# Patient Record
Sex: FEMALE | Race: WHITE | NOT HISPANIC OR LATINO | Employment: PART TIME | URBAN - METROPOLITAN AREA
[De-identification: names, ages, dates, MRNs, and addresses within clinical notes are randomized per-mention and may not be internally consistent; named-entity substitution may affect disease eponyms.]

---

## 2022-04-10 ENCOUNTER — HISTORICAL (OUTPATIENT)
Dept: ADMINISTRATIVE | Facility: HOSPITAL | Age: 22
End: 2022-04-10

## 2022-04-11 ENCOUNTER — HISTORICAL (OUTPATIENT)
Dept: ADMINISTRATIVE | Facility: HOSPITAL | Age: 22
End: 2022-04-11

## 2022-04-28 VITALS
OXYGEN SATURATION: 100 % | SYSTOLIC BLOOD PRESSURE: 121 MMHG | HEIGHT: 60 IN | DIASTOLIC BLOOD PRESSURE: 84 MMHG | WEIGHT: 116.88 LBS | BODY MASS INDEX: 22.95 KG/M2

## 2022-04-28 VITALS
DIASTOLIC BLOOD PRESSURE: 84 MMHG | BODY MASS INDEX: 22.95 KG/M2 | SYSTOLIC BLOOD PRESSURE: 121 MMHG | WEIGHT: 116.88 LBS | OXYGEN SATURATION: 100 % | HEIGHT: 60 IN

## 2022-12-28 ENCOUNTER — TELEPHONE (OUTPATIENT)
Dept: ADMINISTRATIVE | Facility: HOSPITAL | Age: 22
End: 2022-12-28
Payer: COMMERCIAL

## 2022-12-28 NOTE — TELEPHONE ENCOUNTER
----- Message from Cassidy Vivar MA sent at 12/22/2022 12:53 PM CST -----  Regarding: Dr ROCHELLE MARES Thursday 1-5-23       1. Are there any outstanding Labs, imaging or referrals in the patient's chart?        New Patient     No labs required    Please bring a list of Medications (mg and directions), Medical History, Surgical History, Family History, Allergy list, Immunizations record, Names of all past doctors we may need to request records from.           2. . Has the patient been seen in an ER, urgent care clinic, or any other health care    provider since their last visit? If yes when and where?

## 2023-01-05 ENCOUNTER — OFFICE VISIT (OUTPATIENT)
Dept: INTERNAL MEDICINE | Facility: CLINIC | Age: 23
End: 2023-01-05
Payer: COMMERCIAL

## 2023-01-05 VITALS
DIASTOLIC BLOOD PRESSURE: 74 MMHG | BODY MASS INDEX: 28.02 KG/M2 | WEIGHT: 139 LBS | SYSTOLIC BLOOD PRESSURE: 118 MMHG | HEART RATE: 96 BPM | OXYGEN SATURATION: 99 % | TEMPERATURE: 98 F | HEIGHT: 59 IN

## 2023-01-05 DIAGNOSIS — Z76.89 ESTABLISHING CARE WITH NEW DOCTOR, ENCOUNTER FOR: ICD-10-CM

## 2023-01-05 DIAGNOSIS — Z00.00 WELLNESS EXAMINATION: Primary | ICD-10-CM

## 2023-01-05 DIAGNOSIS — Z23 NEED FOR VACCINATION: Primary | ICD-10-CM

## 2023-01-05 DIAGNOSIS — R00.2 PALPITATIONS: ICD-10-CM

## 2023-01-05 PROCEDURE — 90686 IIV4 VACC NO PRSV 0.5 ML IM: CPT | Mod: ,,, | Performed by: INTERNAL MEDICINE

## 2023-01-05 PROCEDURE — 99213 PR OFFICE/OUTPT VISIT, EST, LEVL III, 20-29 MIN: ICD-10-PCS | Mod: 25,,, | Performed by: INTERNAL MEDICINE

## 2023-01-05 PROCEDURE — 90471 FLU VACCINE (QUAD) GREATER THAN OR EQUAL TO 3YO PRESERVATIVE FREE IM: ICD-10-PCS | Mod: ,,, | Performed by: INTERNAL MEDICINE

## 2023-01-05 PROCEDURE — 99385 PR PREVENTIVE VISIT,NEW,18-39: ICD-10-PCS | Mod: 1E,GY,, | Performed by: INTERNAL MEDICINE

## 2023-01-05 PROCEDURE — 99213 OFFICE O/P EST LOW 20 MIN: CPT | Mod: 25,,, | Performed by: INTERNAL MEDICINE

## 2023-01-05 PROCEDURE — 99385 PREV VISIT NEW AGE 18-39: CPT | Mod: 1E,GY,, | Performed by: INTERNAL MEDICINE

## 2023-01-05 PROCEDURE — 90686 FLU VACCINE (QUAD) GREATER THAN OR EQUAL TO 3YO PRESERVATIVE FREE IM: ICD-10-PCS | Mod: ,,, | Performed by: INTERNAL MEDICINE

## 2023-01-05 PROCEDURE — 90471 IMMUNIZATION ADMIN: CPT | Mod: ,,, | Performed by: INTERNAL MEDICINE

## 2023-01-05 NOTE — ASSESSMENT & PLAN NOTE
-symptoms started after a recent strep infection   -patient has been very athletic all her life and recently has unable to be very active because of shortness of breath and palpitations   - discussed about possibility of postviral cardiac involvement and I will go ahead and get an EKG as well as an echocardiogram to rule out any possibility of pericarditis/effusion

## 2023-01-05 NOTE — PROGRESS NOTES
Subjective:      Patient ID: Suraj Haro is a 22 y.o. female.    Chief Complaint: Establish Care    Suraj is a 22-year-old female who is here today to establish care.  Overall healthy. We will go ahead and get a wellness exam achieved.       A separate E/ M visit was performed for palpitations and shortness of breath   Patient originally is very athletic in nature however recently had a strep throat infection last month and since then has been having significant shortness of breath and deconditioning as well as palpitations.    We discussed getting an echocardiogram and EKG for completion sake.    She does not smoke or drink excessively           The patient's Health Maintenance was reviewed and the following appears to be due at this time:   Health Maintenance Due   Topic Date Due    Lipid Panel  Never done        Past Medical History:  Past Medical History:   Diagnosis Date    Anxiety     Depression     Laryngopharyngeal reflux (LPR)      History reviewed. No pertinent surgical history.  Review of patient's allergies indicates:  No Known Allergies  Social History     Socioeconomic History    Marital status: Single   Tobacco Use    Smoking status: Never    Smokeless tobacco: Never   Substance and Sexual Activity    Alcohol use: Not Currently     Comment: 3 glasses of wine a month, normally in the same day    Drug use: Never    Sexual activity: Never     Family History   Problem Relation Age of Onset    Depression Mother     Hypertension Mother     Mental illness Mother         Anxiety    Alcohol abuse Father     Diabetes Father     Hyperlipidemia Father     Alcohol abuse Maternal Grandfather             Early death Maternal Grandfather     Arthritis Maternal Grandmother     Depression Maternal Grandmother     Mental illness Maternal Grandmother         Anxiety    Early death Paternal Grandfather     Learning disabilities Brother         ADHD and ADD       Review of Systems    A comprehensive review of  "systems was performed and is negative except for that stated above  Objective:   /74 (BP Location: Left arm, Patient Position: Sitting, BP Method: Small (Manual))   Pulse 96   Temp 98.4 °F (36.9 °C) (Temporal)   Ht 4' 11" (1.499 m)   Wt 63 kg (139 lb)   LMP 12/25/2022 (Exact Date)   SpO2 99%   BMI 28.07 kg/m²     Physical Exam  Constitutional:       Appearance: Normal appearance.   HENT:      Head: Normocephalic and atraumatic.      Nose: Nose normal.      Mouth/Throat:      Mouth: Mucous membranes are moist.      Pharynx: Oropharynx is clear.   Eyes:      Extraocular Movements: Extraocular movements intact.      Pupils: Pupils are equal, round, and reactive to light.   Cardiovascular:      Rate and Rhythm: Normal rate and regular rhythm.      Pulses: Normal pulses.   Pulmonary:      Effort: Pulmonary effort is normal.      Breath sounds: Normal breath sounds.   Abdominal:      General: Bowel sounds are normal.      Palpations: Abdomen is soft.   Musculoskeletal:         General: Normal range of motion.      Cervical back: Normal range of motion and neck supple.   Skin:     General: Skin is warm.   Neurological:      General: No focal deficit present.      Mental Status: She is alert and oriented to person, place, and time. Mental status is at baseline.   Psychiatric:         Mood and Affect: Mood normal.     Assessment/ Plan:   1. Need for vaccination  -     Influenza - Quadrivalent (PF)    2. Palpitations  Assessment & Plan:   -symptoms started after a recent strep infection   -patient has been very athletic all her life and recently has unable to be very active because of shortness of breath and palpitations   - discussed about possibility of postviral cardiac involvement and I will go ahead and get an EKG as well as an echocardiogram to rule out any possibility of pericarditis/effusion      3. Establishing care with new doctor, encounter for  Assessment & Plan:  -labs are  Ordered and will follow-up " on results  -patient is advised on importance of watching her carbohydrate intake and saturated fat intake, making the right nutritional choices and exercising on a regular basis  -up-to-date with the screening

## 2023-01-05 NOTE — ASSESSMENT & PLAN NOTE
-labs are  Ordered and will follow-up on results  -patient is advised on importance of watching her carbohydrate intake and saturated fat intake, making the right nutritional choices and exercising on a regular basis  -up-to-date with the screening

## 2023-01-06 ENCOUNTER — LAB VISIT (OUTPATIENT)
Dept: LAB | Facility: HOSPITAL | Age: 23
End: 2023-01-06
Attending: INTERNAL MEDICINE
Payer: COMMERCIAL

## 2023-01-06 DIAGNOSIS — Z00.00 WELLNESS EXAMINATION: ICD-10-CM

## 2023-01-06 DIAGNOSIS — R00.2 PALPITATIONS: ICD-10-CM

## 2023-01-06 LAB
ALBUMIN SERPL-MCNC: 3.9 G/DL (ref 3.5–5)
ALBUMIN/GLOB SERPL: 1.3 RATIO (ref 1.1–2)
ALP SERPL-CCNC: 82 UNIT/L (ref 40–150)
ALT SERPL-CCNC: 11 UNIT/L (ref 0–55)
AST SERPL-CCNC: 15 UNIT/L (ref 5–34)
BASOPHILS # BLD AUTO: 0.04 X10(3)/MCL (ref 0–0.2)
BASOPHILS NFR BLD AUTO: 0.5 %
BILIRUBIN DIRECT+TOT PNL SERPL-MCNC: 0.8 MG/DL
BUN SERPL-MCNC: 9.5 MG/DL (ref 7–18.7)
CALCIUM SERPL-MCNC: 9.6 MG/DL (ref 8.4–10.2)
CHLORIDE SERPL-SCNC: 107 MMOL/L (ref 98–107)
CHOLEST SERPL-MCNC: 167 MG/DL
CHOLEST/HDLC SERPL: 4 {RATIO} (ref 0–5)
CO2 SERPL-SCNC: 26 MMOL/L (ref 22–29)
CREAT SERPL-MCNC: 0.78 MG/DL (ref 0.55–1.02)
EOSINOPHIL # BLD AUTO: 0.17 X10(3)/MCL (ref 0–0.9)
EOSINOPHIL NFR BLD AUTO: 2 %
ERYTHROCYTE [DISTWIDTH] IN BLOOD BY AUTOMATED COUNT: 12.9 % (ref 11–14.5)
EST. AVERAGE GLUCOSE BLD GHB EST-MCNC: 96.8 MG/DL
GFR SERPLBLD CREATININE-BSD FMLA CKD-EPI: >60 MLS/MIN/1.73/M2
GLOBULIN SER-MCNC: 2.9 GM/DL (ref 2.4–3.5)
GLUCOSE SERPL-MCNC: 95 MG/DL (ref 74–100)
HBA1C MFR BLD: 5 %
HCT VFR BLD AUTO: 42.2 % (ref 37–47)
HDLC SERPL-MCNC: 46 MG/DL (ref 35–60)
HGB BLD-MCNC: 13.3 GM/DL (ref 12–16)
IMM GRANULOCYTES # BLD AUTO: 0.03 X10(3)/MCL (ref 0–0.04)
IMM GRANULOCYTES NFR BLD AUTO: 0.4 %
LDLC SERPL CALC-MCNC: 109 MG/DL (ref 50–140)
LYMPHOCYTES # BLD AUTO: 1.88 X10(3)/MCL (ref 0.6–4.6)
LYMPHOCYTES NFR BLD AUTO: 22.5 %
MCH RBC QN AUTO: 27.4 PG
MCHC RBC AUTO-ENTMCNC: 31.5 MG/DL (ref 33–36)
MCV RBC AUTO: 87 FL (ref 80–94)
MONOCYTES # BLD AUTO: 0.77 X10(3)/MCL (ref 0.1–1.3)
MONOCYTES NFR BLD AUTO: 9.2 %
NEUTROPHILS # BLD AUTO: 5.48 X10(3)/MCL (ref 2.1–9.2)
NEUTROPHILS NFR BLD AUTO: 65.4 %
NRBC BLD AUTO-RTO: 0 % (ref 0–1)
PLATELET # BLD AUTO: 316 X10(3)/MCL (ref 140–371)
PMV BLD AUTO: 10.1 FL (ref 9.4–12.4)
POTASSIUM SERPL-SCNC: 4.2 MMOL/L (ref 3.5–5.1)
PROT SERPL-MCNC: 6.8 GM/DL (ref 6.4–8.3)
RBC # BLD AUTO: 4.85 X10(6)/MCL (ref 4.2–5.4)
SODIUM SERPL-SCNC: 140 MMOL/L (ref 136–145)
TRIGL SERPL-MCNC: 59 MG/DL (ref 37–140)
TSH SERPL-ACNC: 2.17 UIU/ML (ref 0.35–4.94)
VLDLC SERPL CALC-MCNC: 12 MG/DL
WBC # SPEC AUTO: 8.4 X10(3)/MCL (ref 4.5–11.5)

## 2023-01-06 PROCEDURE — 93010 ELECTROCARDIOGRAM REPORT: CPT | Mod: ,,, | Performed by: INTERNAL MEDICINE

## 2023-01-06 PROCEDURE — 80061 LIPID PANEL: CPT

## 2023-01-06 PROCEDURE — 36415 COLL VENOUS BLD VENIPUNCTURE: CPT

## 2023-01-06 PROCEDURE — 83036 HEMOGLOBIN GLYCOSYLATED A1C: CPT

## 2023-01-06 PROCEDURE — 85025 COMPLETE CBC W/AUTO DIFF WBC: CPT

## 2023-01-06 PROCEDURE — 93005 ELECTROCARDIOGRAM TRACING: CPT

## 2023-01-06 PROCEDURE — 84443 ASSAY THYROID STIM HORMONE: CPT

## 2023-01-06 PROCEDURE — 80053 COMPREHEN METABOLIC PANEL: CPT

## 2023-01-06 PROCEDURE — 93010 EKG 12-LEAD: ICD-10-PCS | Mod: ,,, | Performed by: INTERNAL MEDICINE

## 2023-01-13 ENCOUNTER — HOSPITAL ENCOUNTER (OUTPATIENT)
Dept: CARDIOLOGY | Facility: HOSPITAL | Age: 23
Discharge: HOME | End: 2023-01-13
Attending: INTERNAL MEDICINE
Payer: COMMERCIAL

## 2023-01-13 VITALS — BODY MASS INDEX: 28 KG/M2 | HEIGHT: 59 IN | WEIGHT: 138.88 LBS

## 2023-01-13 DIAGNOSIS — R00.2 PALPITATIONS: ICD-10-CM

## 2023-01-13 PROCEDURE — 93306 TTE W/DOPPLER COMPLETE: CPT

## 2023-01-13 PROCEDURE — 93306 ECHO (CUPID ONLY): ICD-10-PCS | Mod: 26,,, | Performed by: STUDENT IN AN ORGANIZED HEALTH CARE EDUCATION/TRAINING PROGRAM

## 2023-01-13 PROCEDURE — 93306 TTE W/DOPPLER COMPLETE: CPT | Mod: 26,,, | Performed by: STUDENT IN AN ORGANIZED HEALTH CARE EDUCATION/TRAINING PROGRAM

## 2023-01-14 LAB
AV INDEX (PROSTH): 0.82
AV MEAN GRADIENT: 4 MMHG
AV PEAK GRADIENT: 7 MMHG
AV VALVE AREA: 2.08 CM2
AV VELOCITY RATIO: 0.84
BSA FOR ECHO PROCEDURE: 1.62 M2
CV ECHO LV RWT: 0.41 CM
DOP CALC AO PEAK VEL: 1.31 M/S
DOP CALC AO VTI: 24.3 CM
DOP CALC LVOT AREA: 2.5 CM2
DOP CALC LVOT DIAMETER: 1.8 CM
DOP CALC LVOT PEAK VEL: 1.1 M/S
DOP CALC LVOT STROKE VOLUME: 50.61 CM3
DOP CALC MV VTI: 24.4 CM
DOP CALCLVOT PEAK VEL VTI: 19.9 CM
E WAVE DECELERATION TIME: 148 MSEC
E/A RATIO: 1.32
E/E' RATIO: 6.82 M/S
ECHO LV POSTERIOR WALL: 0.79 CM (ref 0.6–1.1)
EJECTION FRACTION: 60 %
FRACTIONAL SHORTENING: 33 % (ref 28–44)
INTERVENTRICULAR SEPTUM: 0.89 CM (ref 0.6–1.1)
LEFT ATRIUM SIZE: 2.9 CM
LEFT ATRIUM VOLUME INDEX MOD: 17.7 ML/M2
LEFT ATRIUM VOLUME MOD: 28 CM3
LEFT INTERNAL DIMENSION IN SYSTOLE: 2.57 CM (ref 2.1–4)
LEFT VENTRICLE DIASTOLIC VOLUME INDEX: 40.44 ML/M2
LEFT VENTRICLE DIASTOLIC VOLUME: 63.9 ML
LEFT VENTRICLE MASS INDEX: 59 G/M2
LEFT VENTRICLE SYSTOLIC VOLUME INDEX: 15.1 ML/M2
LEFT VENTRICLE SYSTOLIC VOLUME: 23.9 ML
LEFT VENTRICULAR INTERNAL DIMENSION IN DIASTOLE: 3.85 CM (ref 3.5–6)
LEFT VENTRICULAR MASS: 93.82 G
LV LATERAL E/E' RATIO: 6.11 M/S
LV SEPTAL E/E' RATIO: 7.73 M/S
LVOT MG: 3 MMHG
LVOT MV: 0.75 CM/S
MV MEAN GRADIENT: 4 MMHG
MV PEAK A VEL: 0.88 M/S
MV PEAK E VEL: 1.16 M/S
MV PEAK GRADIENT: 6 MMHG
MV VALVE AREA BY CONTINUITY EQUATION: 2.07 CM2
RA PRESSURE: 3 MMHG
RA WIDTH: 2.69 CM
RIGHT VENTRICULAR END-DIASTOLIC DIMENSION: 2.42 CM
TDI LATERAL: 0.19 M/S
TDI SEPTAL: 0.15 M/S
TDI: 0.17 M/S
TRICUSPID ANNULAR PLANE SYSTOLIC EXCURSION: 2.44 CM

## 2023-03-02 ENCOUNTER — OFFICE VISIT (OUTPATIENT)
Dept: INTERNAL MEDICINE | Facility: CLINIC | Age: 23
End: 2023-03-02
Payer: COMMERCIAL

## 2023-03-02 VITALS
BODY MASS INDEX: 28.63 KG/M2 | HEART RATE: 93 BPM | SYSTOLIC BLOOD PRESSURE: 104 MMHG | TEMPERATURE: 98 F | OXYGEN SATURATION: 99 % | HEIGHT: 59 IN | DIASTOLIC BLOOD PRESSURE: 76 MMHG | WEIGHT: 142 LBS

## 2023-03-02 DIAGNOSIS — H57.89 EYE SWELLING, LEFT: ICD-10-CM

## 2023-03-02 PROCEDURE — 99212 PR OFFICE/OUTPT VISIT, EST, LEVL II, 10-19 MIN: ICD-10-PCS | Mod: ,,, | Performed by: INTERNAL MEDICINE

## 2023-03-02 PROCEDURE — 99212 OFFICE O/P EST SF 10 MIN: CPT | Mod: ,,, | Performed by: INTERNAL MEDICINE

## 2023-03-02 NOTE — PROGRESS NOTES
Subjective:      Patient ID: Suraj Haro is a 23 y.o. female.    Chief Complaint: Facial Swelling (Left side)    Suraj is a 23-year-old female who is here today for a requested visit with some left eye swelling and redness.  However this has since resolved.  Possibly patient had a little insect bite or scratched her IR something.  She does feel some pressure behind her eye.  Is established with Ophthalmology and is advised to make a follow-up with them if symptoms persist.  At this time no indication for any further treatment  Patient admits that she got a little anxious and overwhelmed with her swelling of the eye.  She was reassured that it is nothing to worry about.  Does get a little tearful and provided emotional encouragement     The patient's Health Maintenance was reviewed and the following appears to be due at this time:   Health Maintenance Due   Topic Date Due    Pap Smear  2023        Past Medical History:  Past Medical History:   Diagnosis Date    Anxiety     Depression     Laryngopharyngeal reflux (LPR)      History reviewed. No pertinent surgical history.  Review of patient's allergies indicates:  No Known Allergies  Social History     Socioeconomic History    Marital status: Single   Tobacco Use    Smoking status: Never    Smokeless tobacco: Never   Substance and Sexual Activity    Alcohol use: Not Currently     Comment: 3 glasses of wine a month, normally in the same day    Drug use: Never    Sexual activity: Never     Family History   Problem Relation Age of Onset    Depression Mother     Hypertension Mother     Mental illness Mother         Anxiety    Alcohol abuse Father     Diabetes Father     Hyperlipidemia Father     Alcohol abuse Maternal Grandfather             Early death Maternal Grandfather     Arthritis Maternal Grandmother     Depression Maternal Grandmother     Mental illness Maternal Grandmother         Anxiety    Early death Paternal Grandfather     Learning  "disabilities Brother         ADHD and ADD       Review of Systems    A comprehensive review of systems was performed and is negative except for that stated above  Objective:   /76 (BP Location: Left arm, Patient Position: Sitting, BP Method: Small (Manual))   Pulse 93   Temp 98.3 °F (36.8 °C) (Temporal)   Ht 4' 11" (1.499 m)   Wt 64.4 kg (142 lb)   LMP 02/24/2023   SpO2 99%   BMI 28.68 kg/m²     Physical Exam  Constitutional:       Appearance: Normal appearance.   HENT:      Head: Normocephalic and atraumatic.      Nose: Nose normal.      Mouth/Throat:      Mouth: Mucous membranes are moist.      Pharynx: Oropharynx is clear.   Eyes:      Extraocular Movements: Extraocular movements intact.      Pupils: Pupils are equal, round, and reactive to light.   Cardiovascular:      Rate and Rhythm: Normal rate and regular rhythm.      Pulses: Normal pulses.   Pulmonary:      Effort: Pulmonary effort is normal.      Breath sounds: Normal breath sounds.   Abdominal:      General: Bowel sounds are normal.      Palpations: Abdomen is soft.   Musculoskeletal:         General: Normal range of motion.      Cervical back: Normal range of motion and neck supple.   Skin:     General: Skin is warm.   Neurological:      General: No focal deficit present.      Mental Status: She is alert and oriented to person, place, and time. Mental status is at baseline.   Psychiatric:         Mood and Affect: Mood normal.     Assessment/ Plan:   1. Eye swelling, left  Assessment & Plan:  -no indication for any antibiotic  -can follow-up with ophthalmology if there is any persistent pressure-like symptoms in the left eye  -some eye exercises recommended            "

## 2023-03-02 NOTE — ASSESSMENT & PLAN NOTE
-no indication for any antibiotic  -can follow-up with ophthalmology if there is any persistent pressure-like symptoms in the left eye  -some eye exercises recommended

## 2023-03-15 ENCOUNTER — PATIENT MESSAGE (OUTPATIENT)
Dept: ADMINISTRATIVE | Facility: HOSPITAL | Age: 23
End: 2023-03-15
Payer: COMMERCIAL

## 2023-03-21 ENCOUNTER — OFFICE VISIT (OUTPATIENT)
Dept: URGENT CARE | Facility: CLINIC | Age: 23
End: 2023-03-21
Payer: COMMERCIAL

## 2023-03-21 VITALS
SYSTOLIC BLOOD PRESSURE: 124 MMHG | DIASTOLIC BLOOD PRESSURE: 81 MMHG | HEIGHT: 59 IN | WEIGHT: 139 LBS | BODY MASS INDEX: 28.02 KG/M2 | HEART RATE: 92 BPM | TEMPERATURE: 98 F | OXYGEN SATURATION: 99 % | RESPIRATION RATE: 18 BRPM

## 2023-03-21 DIAGNOSIS — R05.9 COUGH, UNSPECIFIED TYPE: ICD-10-CM

## 2023-03-21 DIAGNOSIS — J00 ACUTE NASOPHARYNGITIS: Primary | ICD-10-CM

## 2023-03-21 LAB
CTP QC/QA: YES
MOLECULAR STREP A: NEGATIVE
POC MOLECULAR INFLUENZA A AGN: NEGATIVE
POC MOLECULAR INFLUENZA B AGN: NEGATIVE
SARS-COV-2 RDRP RESP QL NAA+PROBE: NEGATIVE

## 2023-03-21 PROCEDURE — 87651 STREP A DNA AMP PROBE: CPT | Mod: QW,,, | Performed by: FAMILY MEDICINE

## 2023-03-21 PROCEDURE — 99213 PR OFFICE/OUTPT VISIT, EST, LEVL III, 20-29 MIN: ICD-10-PCS | Mod: ,,, | Performed by: FAMILY MEDICINE

## 2023-03-21 PROCEDURE — 99213 OFFICE O/P EST LOW 20 MIN: CPT | Mod: ,,, | Performed by: FAMILY MEDICINE

## 2023-03-21 PROCEDURE — 87635: ICD-10-PCS | Mod: QW,,, | Performed by: FAMILY MEDICINE

## 2023-03-21 PROCEDURE — 87651 POCT STREP A MOLECULAR: ICD-10-PCS | Mod: QW,,, | Performed by: FAMILY MEDICINE

## 2023-03-21 PROCEDURE — 87502 POCT INFLUENZA A/B MOLECULAR: ICD-10-PCS | Mod: QW,,, | Performed by: FAMILY MEDICINE

## 2023-03-21 PROCEDURE — 87635 SARS-COV-2 COVID-19 AMP PRB: CPT | Mod: QW,,, | Performed by: FAMILY MEDICINE

## 2023-03-21 PROCEDURE — 87502 INFLUENZA DNA AMP PROBE: CPT | Mod: QW,,, | Performed by: FAMILY MEDICINE

## 2023-03-21 NOTE — PROGRESS NOTES
"Subjective:       Patient ID: Suraj Haro is a 23 y.o. female.    Vitals:  height is 4' 11" (1.499 m) and weight is 63 kg (139 lb). Her temperature is 98.3 °F (36.8 °C). Her blood pressure is 124/81 and her pulse is 92. Her respiration is 18 and oxygen saturation is 99%.     Chief Complaint: Sinus Problem (Sore throat, coughing, and congestion started yesterday.)    HPI:  23-year-old female present to clinic with concerns of nasal congestion, sinus congestion, coughing started yesterday.  No measured fever at home.  Reviewed the vital signs appears stable with O2 sats 99%.  No recent travel.  , for  kids.  Concerns of possible exposure to infections.    ROS  :  Constitutional : _ No fever , Body aches, Chills  HENT_sore throat, postnasal drainage  Respiratory_no wheezing, no shortness of breath  Cardiovascular_no chest pain  Gastrointestinal_ No vomiting, No diarrhea, No abdominal pain  Musculoskeletal_no joint pain, no joint swelling  Integumentary_no skin rash     Objective:      Physical Exam    General : Alert and Oriented, No apparent distress, afebrile, sounds stuffy and congested  Neck - supple, shotty anterior cervical lymph nodes nontender to palpate  HENT : Oropharynx mild redness or swelling. Tonsils not enlarged, no exudate, bilateral TMs intact mild fluid no redness.   Respiratory : Bilateral equal breath sounds, nonlabored respirations  Cardiovascular : Rate, rhythm regular, normal volume pulse, no murmur  Integumentary : Warm, Dry and no rash    Assessment:       1. Acute nasopharyngitis    2. Cough, unspecified type       Plan:     Discussed the physical finding , Condition and course  Concerns of allergies or viral etiology.  Monitor the symptoms, adequate hydration and rest  Tylenol and ibuprofen for fever, body aches and sore throat.   Warm saltwater gargles for sore throat.   Claritin 10 mg or Zyrtec 10 mg for nasal congestion  Robitussin-DM for cough and " cold as needed and as directed  Strep test negative, flu test negative, COVID-19 test negative  With signs of infection and worsening symptoms call or return to clinic will consider antibiotics      Acute nasopharyngitis    Cough, unspecified type  -     POCT COVID-19 Rapid Screening  -     POCT Influenza A/B Molecular  -     POCT Strep A, Molecular

## 2023-03-21 NOTE — PATIENT INSTRUCTIONS
Discussed the physical finding , Condition and course  Concerns of allergies or viral etiology.  Monitor the symptoms, adequate hydration and rest  Tylenol and ibuprofen for fever, body aches and sore throat.   Warm saltwater gargles for sore throat.   Claritin 10 mg or Zyrtec 10 mg for nasal congestion  Robitussin-DM for cough and cold as needed and as directed  Strep test negative, flu test negative, COVID-19 test negative  With signs of infection and worsening symptoms call or return to clinic will consider antibiotics

## 2023-04-04 ENCOUNTER — TELEPHONE (OUTPATIENT)
Dept: ADMINISTRATIVE | Facility: HOSPITAL | Age: 23
End: 2023-04-04
Payer: COMMERCIAL

## 2023-04-04 NOTE — TELEPHONE ENCOUNTER
----- Message from Cassidy Vivar MA sent at 3/28/2023 12:47 PM CDT -----  Regarding: Dr ROCHELLE MARES Thursday 4-6-23       1. Are there any outstanding Labs, imaging or referrals in the patient's chart?      3 month follow up for palpitations    No labs required    New Pt 1-5-23           2. . Has the patient been seen in an ER, urgent care clinic, or any other health care    provider since their last visit? If yes when and where?

## 2023-04-06 ENCOUNTER — OFFICE VISIT (OUTPATIENT)
Dept: INTERNAL MEDICINE | Facility: CLINIC | Age: 23
End: 2023-04-06
Payer: COMMERCIAL

## 2023-04-06 VITALS
HEIGHT: 59 IN | SYSTOLIC BLOOD PRESSURE: 99 MMHG | DIASTOLIC BLOOD PRESSURE: 78 MMHG | BODY MASS INDEX: 28.43 KG/M2 | HEART RATE: 90 BPM | OXYGEN SATURATION: 99 % | WEIGHT: 141 LBS | TEMPERATURE: 98 F

## 2023-04-06 DIAGNOSIS — F41.9 ANXIETY: ICD-10-CM

## 2023-04-06 DIAGNOSIS — R00.2 PALPITATIONS: Primary | ICD-10-CM

## 2023-04-06 PROCEDURE — 99213 PR OFFICE/OUTPT VISIT, EST, LEVL III, 20-29 MIN: ICD-10-PCS | Mod: ,,, | Performed by: INTERNAL MEDICINE

## 2023-04-06 PROCEDURE — 99213 OFFICE O/P EST LOW 20 MIN: CPT | Mod: ,,, | Performed by: INTERNAL MEDICINE

## 2023-04-06 NOTE — ASSESSMENT & PLAN NOTE
-much improved, no acute issues   -not on any medications   -advised to call if any other symptoms do arise that concern her

## 2023-04-06 NOTE — PROGRESS NOTES
Subjective:      Patient ID: Suraj Haro is a 23 y.o. female.    Chief Complaint: Follow-up (3 month palpitations/)    Suraj is a 23-year-old female who is here today for follow-up after she was seen for anxiety and palpitations last month.  Overall doing well.    At that visit she also had insect bite on her eye was she was anxious about.  This seems to have since resolved.  No acute needs or complaints today.  Currently not on any medications and seems to be doing well.  Provided emotional encouragement and support.      Advised to call if anything changes however for now we will see her next year for wellness.      The patient's Health Maintenance was reviewed and the following appears to be due at this time:   Health Maintenance Due   Topic Date Due    Pap Smear  Never done        Past Medical History:  Past Medical History:   Diagnosis Date    Anxiety     Depression     Laryngopharyngeal reflux (LPR)      History reviewed. No pertinent surgical history.  Review of patient's allergies indicates:  No Known Allergies  Social History     Socioeconomic History    Marital status: Single   Tobacco Use    Smoking status: Never    Smokeless tobacco: Never   Substance and Sexual Activity    Alcohol use: Not Currently     Comment: 3 glasses of wine a month, normally in the same day    Drug use: Never    Sexual activity: Never     Social Determinants of Health     Financial Resource Strain: Low Risk     Difficulty of Paying Living Expenses: Not hard at all   Food Insecurity: No Food Insecurity    Worried About Running Out of Food in the Last Year: Never true    Ran Out of Food in the Last Year: Never true   Transportation Needs: No Transportation Needs    Lack of Transportation (Medical): No    Lack of Transportation (Non-Medical): No   Physical Activity: Sufficiently Active    Days of Exercise per Week: 4 days    Minutes of Exercise per Session: 60 min   Stress: No Stress Concern Present    Feeling of Stress :  "Not at all   Social Connections: Unknown    Frequency of Communication with Friends and Family: More than three times a week    Frequency of Social Gatherings with Friends and Family: More than three times a week    Attends [employer] Services: Patient refused    Active Member of Clubs or Organizations: No    Attends Club or Organization Meetings: Never    Marital Status: Never    Housing Stability: Low Risk     Unable to Pay for Housing in the Last Year: No    Number of Places Lived in the Last Year: 1    Unstable Housing in the Last Year: No     Family History   Problem Relation Age of Onset    Depression Mother     Hypertension Mother     Mental illness Mother         Anxiety    Alcohol abuse Father     Diabetes Father     Hyperlipidemia Father     Alcohol abuse Maternal Grandfather             Early death Maternal Grandfather     Arthritis Maternal Grandmother     Depression Maternal Grandmother     Mental illness Maternal Grandmother         Anxiety    Early death Paternal Grandfather     Learning disabilities Brother         ADHD and ADD       Review of Systems    A comprehensive review of systems was performed and is negative except for that stated above  Objective:   BP 99/78 (BP Location: Left arm, Patient Position: Sitting, BP Method: Small (Manual))   Pulse 90   Temp 98 °F (36.7 °C) (Temporal)   Ht 4' 11" (1.499 m)   Wt 64 kg (141 lb)   LMP 2023 (Exact Date)   SpO2 99%   BMI 28.48 kg/m²     Physical Exam  Constitutional:       Appearance: Normal appearance.   HENT:      Head: Normocephalic and atraumatic.      Nose: Nose normal.      Mouth/Throat:      Mouth: Mucous membranes are moist.      Pharynx: Oropharynx is clear.   Eyes:      Extraocular Movements: Extraocular movements intact.      Pupils: Pupils are equal, round, and reactive to light.   Cardiovascular:      Rate and Rhythm: Normal rate and regular rhythm.      Pulses: Normal pulses.   Pulmonary:      Effort: Pulmonary " effort is normal.      Breath sounds: Normal breath sounds.   Abdominal:      General: Bowel sounds are normal.      Palpations: Abdomen is soft.   Musculoskeletal:         General: Normal range of motion.      Cervical back: Normal range of motion and neck supple.   Skin:     General: Skin is warm.   Neurological:      General: No focal deficit present.      Mental Status: She is alert and oriented to person, place, and time. Mental status is at baseline.   Psychiatric:         Mood and Affect: Mood normal.     Assessment/ Plan:   1. Palpitations  Assessment & Plan:  -resolved       2. Anxiety  Assessment & Plan:  -much improved, no acute issues   -not on any medications   -advised to call if any other symptoms do arise that concern her

## 2023-04-11 ENCOUNTER — PATIENT MESSAGE (OUTPATIENT)
Dept: INTERNAL MEDICINE | Facility: CLINIC | Age: 23
End: 2023-04-11
Payer: COMMERCIAL

## 2023-04-11 DIAGNOSIS — R05.9 COUGH, UNSPECIFIED TYPE: Primary | ICD-10-CM

## 2023-04-11 RX ORDER — BENZONATATE 100 MG/1
100 CAPSULE ORAL 3 TIMES DAILY PRN
Qty: 90 CAPSULE | Refills: 0 | Status: SHIPPED | OUTPATIENT
Start: 2023-04-11 | End: 2023-04-21

## 2023-04-11 NOTE — TELEPHONE ENCOUNTER
Per Josh Benzonatate 100mg TID has been sent to the pharmacy. Pt has been notified via the portal.

## 2023-04-17 ENCOUNTER — PATIENT MESSAGE (OUTPATIENT)
Dept: ADMINISTRATIVE | Facility: HOSPITAL | Age: 23
End: 2023-04-17
Payer: COMMERCIAL

## 2023-06-26 ENCOUNTER — PATIENT MESSAGE (OUTPATIENT)
Dept: ADMINISTRATIVE | Facility: HOSPITAL | Age: 23
End: 2023-06-26
Payer: COMMERCIAL

## 2023-07-31 ENCOUNTER — OFFICE VISIT (OUTPATIENT)
Dept: URGENT CARE | Facility: CLINIC | Age: 23
End: 2023-07-31
Payer: COMMERCIAL

## 2023-07-31 VITALS
SYSTOLIC BLOOD PRESSURE: 119 MMHG | OXYGEN SATURATION: 100 % | RESPIRATION RATE: 17 BRPM | TEMPERATURE: 99 F | WEIGHT: 141 LBS | DIASTOLIC BLOOD PRESSURE: 82 MMHG | BODY MASS INDEX: 28.43 KG/M2 | HEART RATE: 93 BPM | HEIGHT: 59 IN

## 2023-07-31 DIAGNOSIS — J02.9 SORE THROAT: ICD-10-CM

## 2023-07-31 DIAGNOSIS — J02.0 STREP THROAT: Primary | ICD-10-CM

## 2023-07-31 LAB
CTP QC/QA: YES
MOLECULAR STREP A: POSITIVE

## 2023-07-31 PROCEDURE — 99213 OFFICE O/P EST LOW 20 MIN: CPT | Mod: ,,, | Performed by: FAMILY MEDICINE

## 2023-07-31 PROCEDURE — 99213 PR OFFICE/OUTPT VISIT, EST, LEVL III, 20-29 MIN: ICD-10-PCS | Mod: ,,, | Performed by: FAMILY MEDICINE

## 2023-07-31 PROCEDURE — 87651 POCT STREP A MOLECULAR: ICD-10-PCS | Mod: QW,,, | Performed by: FAMILY MEDICINE

## 2023-07-31 PROCEDURE — 87651 STREP A DNA AMP PROBE: CPT | Mod: QW,,, | Performed by: FAMILY MEDICINE

## 2023-07-31 RX ORDER — AMOXICILLIN 500 MG/1
500 CAPSULE ORAL EVERY 12 HOURS
Qty: 20 CAPSULE | Refills: 0 | Status: SHIPPED | OUTPATIENT
Start: 2023-07-31 | End: 2023-08-10

## 2023-07-31 NOTE — PATIENT INSTRUCTIONS
Strep swab positive for throat infection. Condition and course discussed. Adequate hydration and rest.   Noninfectious after 2 days on medicine and no fever (temp less than 100.4 F )  Change tooth brush after 2 days on medicine  Claritin 10 mg for nasal congestion.   Warm saltwater gargles for sore throat.   Tylenol and ibuprofen as needed for sore throat and fever.    Medications as directed to complete the course  Call or return to clinic as needed    Work excuse today and tomorrow

## 2023-07-31 NOTE — PROGRESS NOTES
"Subjective:      Patient ID: Suraj Haro is a 23 y.o. female.    Vitals:  height is 4' 11" (1.499 m) and weight is 64 kg (141 lb). Her temperature is 98.9 °F (37.2 °C). Her blood pressure is 119/82 and her pulse is 93. Her respiration is 17 and oxygen saturation is 100%.     Chief Complaint: Sore Throat (Scratchy throat since last night. Worse this morning. Tried taking ibuprofen yesterday. )    HPI: 23-year-old female present to clinic with concerns of sore throat and difficulty swallowing since yesterday.  No measured fever.  Symptoms were since morning.  Ibuprofen was last night.  No fever in the clinic.  Last strep infection was December 2022    ROS :  Constitutional : _ No fever , Body aches, Chills  Eyes : _No redness, drainage or pain  HENT_sore throat, postnasal drainage  Respiratory_no wheezing, no shortness of breath  Cardiovascular_no chest pain  Gastrointestinal_  denies nausea vomiting or abdominal pain  Musculoskeletal_no joint pain, no joint swelling  Integumentary_no skin rash     Objective:     Physical Exam  General : Alert and Oriented, No apparent distress, afebrile  Neck - supple , anterior cervical lymph nodes enlarged tender  HENT : Oropharynx  and tonsils appears erythematous and swollen, tonsils 2+ bilateral no exudate.TMs intact mild fluid no redness.   Respiratory : Bilateral equal breath sounds, nonlabored respirations  Cardiovascular : Rate, rhythm regular, normal volume pulse, no murmur  Integumentary : Warm, Dry and no rash    Assessment:     1. Strep throat    2. Sore throat      Plan:   Strep swab positive for throat infection. Condition and course discussed. Adequate hydration and rest.   Noninfectious after 2 days on medicine and no fever (temp less than 100.4 F )  Change tooth brush after 2 days on medicine  Claritin 10 mg for nasal congestion.   Warm saltwater gargles for sore throat.   Tylenol and ibuprofen as needed for sore throat and fever.    Medications as " directed to complete the course  Call or return to clinic as needed    Work excuse today and tomorrow    Strep throat  -     amoxicillin (AMOXIL) 500 MG capsule; Take 1 capsule (500 mg total) by mouth every 12 (twelve) hours. for 10 days  Dispense: 20 capsule; Refill: 0    Sore throat  -     POCT Strep A, Molecular

## 2023-10-17 ENCOUNTER — PATIENT MESSAGE (OUTPATIENT)
Dept: ADMINISTRATIVE | Facility: HOSPITAL | Age: 23
End: 2023-10-17
Payer: COMMERCIAL

## 2024-03-14 DIAGNOSIS — Z00.00 WELLNESS EXAMINATION: Primary | ICD-10-CM

## 2024-04-02 ENCOUNTER — TELEPHONE (OUTPATIENT)
Dept: INTERNAL MEDICINE | Facility: CLINIC | Age: 24
End: 2024-04-02
Payer: COMMERCIAL

## 2024-04-09 ENCOUNTER — OFFICE VISIT (OUTPATIENT)
Dept: INTERNAL MEDICINE | Facility: CLINIC | Age: 24
End: 2024-04-09
Payer: COMMERCIAL

## 2024-04-09 VITALS
HEART RATE: 90 BPM | WEIGHT: 144 LBS | HEIGHT: 59 IN | DIASTOLIC BLOOD PRESSURE: 70 MMHG | SYSTOLIC BLOOD PRESSURE: 116 MMHG | OXYGEN SATURATION: 99 % | BODY MASS INDEX: 29.03 KG/M2

## 2024-04-09 DIAGNOSIS — Z00.00 WELLNESS EXAMINATION: Primary | ICD-10-CM

## 2024-04-09 PROBLEM — Z76.89 ESTABLISHING CARE WITH NEW DOCTOR, ENCOUNTER FOR: Status: RESOLVED | Noted: 2023-01-05 | Resolved: 2024-04-09

## 2024-04-09 PROCEDURE — 99395 PREV VISIT EST AGE 18-39: CPT | Mod: ,,, | Performed by: INTERNAL MEDICINE

## 2024-04-09 NOTE — PROGRESS NOTES
Subjective:      Patient ID: Suraj Haro is a 24 y.o. female.    Chief Complaint: Annual Exam (Wellness/)    Suraj is a 24-year-old female who is here today for a wellness visit.  Anxiety seems to be better controlled.  Patient was supposed to get wellness labs completed however will get them done at her earliest convenience.      The patient's Health Maintenance was reviewed and the following appears to be due at this time:   Health Maintenance Due   Topic Date Due    Hepatitis C Screening  Never done    HPV Vaccines (1 - 2-dose series) Never done    Pap Smear  Never done    Influenza Vaccine (1) 09/01/2023    COVID-19 Vaccine (3 - 2023-24 season) 09/01/2023        Past Medical History:  Past Medical History:   Diagnosis Date    Anxiety     Depression     Laryngopharyngeal reflux (LPR)      Past Surgical History:   Procedure Laterality Date    NO PAST SURGERIES       Review of patient's allergies indicates:  No Known Allergies  Social History     Socioeconomic History    Marital status: Single   Tobacco Use    Smoking status: Never     Passive exposure: Never    Smokeless tobacco: Never   Substance and Sexual Activity    Alcohol use: Yes     Comment: Around 3 mixed drinks every 2 months    Drug use: Never    Sexual activity: Never     Social Determinants of Health     Financial Resource Strain: Low Risk  (4/6/2023)    Overall Financial Resource Strain (CARDIA)     Difficulty of Paying Living Expenses: Not hard at all   Food Insecurity: No Food Insecurity (4/6/2023)    Hunger Vital Sign     Worried About Running Out of Food in the Last Year: Never true     Ran Out of Food in the Last Year: Never true   Transportation Needs: No Transportation Needs (4/6/2023)    PRAPARE - Transportation     Lack of Transportation (Medical): No     Lack of Transportation (Non-Medical): No   Physical Activity: Sufficiently Active (4/6/2023)    Exercise Vital Sign     Days of Exercise per Week: 4 days     Minutes of Exercise  "per Session: 60 min   Stress: No Stress Concern Present (2023)    Citizen of [place] of Occupational [employer] - Occupational Stress Questionnaire     Feeling of Stress : Not at all   Social Connections: Unknown (2023)    Social Connection and Isolation Panel [NHANES]     Frequency of Communication with Friends and Family: More than three times a week     Frequency of Social Gatherings with Friends and Family: More than three times a week     Attends Sikhism Services: Patient declined     Active Member of Clubs or Organizations: No     Attends Club or Organization Meetings: Never     Marital Status: Never    Housing Stability: Low Risk  (2023)    Housing Stability Vital Sign     Unable to Pay for Housing in the Last Year: No     Number of Places Lived in the Last Year: 1     Unstable Housing in the Last Year: No     Family History   Problem Relation Age of Onset    Depression Mother     Hypertension Mother     Mental illness Mother         Anxiety    Alcohol abuse Father     Diabetes Father     Hyperlipidemia Father     Alcohol abuse Maternal Grandfather             Early death Maternal Grandfather     Arthritis Maternal Grandmother     Depression Maternal Grandmother     Mental illness Maternal Grandmother         Anxiety    Early death Paternal Grandfather     Learning disabilities Brother         ADHD and ADD       Review of Systems    A comprehensive review of systems was performed and is negative except for that stated above  Objective:   /70 (BP Location: Left arm, Patient Position: Sitting, BP Method: Small (Manual))   Pulse 90   Ht 4' 11" (1.499 m)   Wt 65.3 kg (144 lb)   LMP 2024 (Exact Date)   SpO2 99%   BMI 29.08 kg/m²     Physical Exam  Constitutional:       Appearance: Normal appearance.   HENT:      Head: Normocephalic and atraumatic.      Nose: Nose normal.      Mouth/Throat:      Mouth: Mucous membranes are moist.      Pharynx: Oropharynx is clear.   Eyes: "      Extraocular Movements: Extraocular movements intact.      Pupils: Pupils are equal, round, and reactive to light.   Cardiovascular:      Rate and Rhythm: Normal rate and regular rhythm.      Pulses: Normal pulses.   Pulmonary:      Effort: Pulmonary effort is normal.      Breath sounds: Normal breath sounds.   Abdominal:      General: Bowel sounds are normal.      Palpations: Abdomen is soft.   Musculoskeletal:         General: Normal range of motion.      Cervical back: Normal range of motion and neck supple.   Skin:     General: Skin is warm.   Neurological:      General: No focal deficit present.      Mental Status: She is alert and oriented to person, place, and time. Mental status is at baseline.   Psychiatric:         Mood and Affect: Mood normal.       Assessment/ Plan:   1. Wellness examination  Assessment & Plan:  -labs are ordered, patient will get them completed at her earliest convenience and will follow-up on results   -patient is advised on importance of watching her carbohydrate intake and saturated fat intake, making the right nutritional choices and exercising on a regular basis  -up-to-date with the screening

## 2024-04-09 NOTE — ASSESSMENT & PLAN NOTE
-labs are ordered, patient will get them completed at her earliest convenience and will follow-up on results   -patient is advised on importance of watching her carbohydrate intake and saturated fat intake, making the right nutritional choices and exercising on a regular basis  -up-to-date with the screening

## 2024-07-15 PROBLEM — Z00.00 WELLNESS EXAMINATION: Status: RESOLVED | Noted: 2024-04-09 | Resolved: 2024-07-15

## 2024-08-07 ENCOUNTER — PATIENT MESSAGE (OUTPATIENT)
Dept: INTERNAL MEDICINE | Facility: CLINIC | Age: 24
End: 2024-08-07
Payer: COMMERCIAL

## 2024-12-19 ENCOUNTER — OFFICE VISIT (OUTPATIENT)
Dept: URGENT CARE | Facility: CLINIC | Age: 24
End: 2024-12-19
Payer: COMMERCIAL

## 2024-12-19 VITALS
WEIGHT: 134 LBS | DIASTOLIC BLOOD PRESSURE: 85 MMHG | OXYGEN SATURATION: 100 % | TEMPERATURE: 99 F | RESPIRATION RATE: 18 BRPM | BODY MASS INDEX: 26.31 KG/M2 | HEIGHT: 60 IN | HEART RATE: 101 BPM | SYSTOLIC BLOOD PRESSURE: 125 MMHG

## 2024-12-19 DIAGNOSIS — J10.1 INFLUENZA A: Primary | ICD-10-CM

## 2024-12-19 DIAGNOSIS — J02.9 SORE THROAT: ICD-10-CM

## 2024-12-19 LAB
CTP QC/QA: YES
CTP QC/QA: YES
MOLECULAR STREP A: NEGATIVE
POC MOLECULAR INFLUENZA A AGN: POSITIVE
POC MOLECULAR INFLUENZA B AGN: NEGATIVE

## 2024-12-19 NOTE — PROGRESS NOTES
Patient ID: Suraj Haro is a 24 y.o. female.  Chief Complaint: Sore Throat    HPI:   Patient presents here today for above reason.     Patient is a 24 y.o. female who presents to urgent care with complaints of sore throat, cough at times, congestion x Sunday. Alleviating factors include Mucinex with moderate amount of relief. Patient denies any known fever, chest pain, SOB, or any other symptoms.     Past Medical History:  Past Medical History:   Diagnosis Date    Anxiety     Depression     Laryngopharyngeal reflux (LPR)      Past Surgical History:   Procedure Laterality Date    NO PAST SURGERIES       Review of patient's allergies indicates:  No Known Allergies  No current outpatient medications        ROS:   Review of Systems  12 point review of systems conducted, negative except as stated in the history of present illness. See HPI for details.   Vitals/PE:   Visit Vitals  /85   Pulse 101   Temp 99.1 °F (37.3 °C)   Resp 18   Ht 5' (1.524 m)   Wt 60.8 kg (134 lb)   LMP 12/05/2024 (Approximate)   SpO2 100%   BMI 26.17 kg/m²     Physical Exam  Vitals and nursing note reviewed.   Constitutional:       Appearance: She is ill-appearing. She is not toxic-appearing or diaphoretic.   HENT:      Right Ear: Tympanic membrane normal.      Left Ear: Tympanic membrane normal.      Nose: Mucosal edema and congestion present.      Right Turbinates: Enlarged and swollen.      Left Turbinates: Enlarged and swollen.      Right Sinus: Maxillary sinus tenderness and frontal sinus tenderness present.      Left Sinus: Maxillary sinus tenderness and frontal sinus tenderness present.      Mouth/Throat:      Pharynx: Posterior oropharyngeal erythema present.   Eyes:      Pupils: Pupils are equal, round, and reactive to light.   Cardiovascular:      Rate and Rhythm: Normal rate.      Pulses: Normal pulses.   Pulmonary:      Effort: Pulmonary effort is normal.   Skin:     General: Skin is warm.      Capillary Refill:  Capillary refill takes less than 2 seconds.   Neurological:      General: No focal deficit present.      Mental Status: She is alert and oriented to person, place, and time.   Psychiatric:         Mood and Affect: Mood normal.         Results for orders placed or performed in visit on 12/19/24   POCT Strep A, Molecular    Collection Time: 12/19/24 10:56 AM   Result Value Ref Range    Molecular Strep A, POC Negative Negative     Acceptable Yes    POCT Influenza A/B MOLECULAR    Collection Time: 12/19/24 10:56 AM   Result Value Ref Range    POC Molecular Influenza A Ag Positive (A) Negative    POC Molecular Influenza B Ag Negative Negative     Acceptable Yes      Assessment/Plan:   Influenza A  Days of symptoms..  Reviewed risks versus benefit of prescription medications for treatment of flu following the 48 hour window.  She declines/expresses no need for oral antiviral medication at this time given the limited benefits/effect of the medication on flu at this point.  Recommend conservative management with vitamin-C, vitamin-D.  Recommend rest.  Sore throat  -     POCT Strep A, Molecular  -     POCT Influenza A/B MOLECULAR     A positive.  Strep negative.  Orders Placed This Encounter   Procedures    POCT Strep A, Molecular    POCT Influenza A/B MOLECULAR       Education and counseling done face to face regarding medical conditions and plan. Contact office if new symptoms develop. Should any symptoms ever significantly worsen seek emergency medical attention/go to ER. Follow up at least yearly for wellness or sooner PRN. Nurse to call patient with any results. The patient is receptive, expresses understanding and is agreeable to plan. All questions have been answered.

## 2024-12-19 NOTE — PATIENT INSTRUCTIONS
Assessment/Plan:   Influenza A  Days of symptoms..  Reviewed risks versus benefit of prescription medications for treatment of flu following the 48 hour window.  She declines/expresses no need for oral antiviral medication at this time given the limited benefits/effect of the medication on flu at this point.  Recommend conservative management with vitamin-C, vitamin-D.  Recommend rest.  Sore throat  -     POCT Strep A, Molecular  -     POCT Influenza A/B MOLECULAR     A positive.  Strep negative.  Orders Placed This Encounter   Procedures    POCT Strep A, Molecular    POCT Influenza A/B MOLECULAR       Education and counseling done face to face regarding medical conditions and plan. Contact office if new symptoms develop. Should any symptoms ever significantly worsen seek emergency medical attention/go to ER. Follow up at least yearly for wellness or sooner PRN. Nurse to call patient with any results. The patient is receptive, expresses understanding and is agreeable to plan. All questions have been answered.

## 2025-03-20 ENCOUNTER — PATIENT MESSAGE (OUTPATIENT)
Facility: CLINIC | Age: 25
End: 2025-03-20
Payer: COMMERCIAL

## 2025-06-03 DIAGNOSIS — Z13.89 SCREENING FOR CARDIOVASCULAR, RESPIRATORY, AND GENITOURINARY DISEASES: ICD-10-CM

## 2025-06-03 DIAGNOSIS — Z00.00 WELLNESS EXAMINATION: Primary | ICD-10-CM

## 2025-06-03 DIAGNOSIS — Z13.0 SCREENING FOR ENDOCRINE, NUTRITIONAL, METABOLIC AND IMMUNITY DISORDER: ICD-10-CM

## 2025-06-03 DIAGNOSIS — Z13.21 SCREENING FOR ENDOCRINE, NUTRITIONAL, METABOLIC AND IMMUNITY DISORDER: ICD-10-CM

## 2025-06-03 DIAGNOSIS — Z13.228 SCREENING FOR ENDOCRINE, NUTRITIONAL, METABOLIC AND IMMUNITY DISORDER: ICD-10-CM

## 2025-06-03 DIAGNOSIS — Z13.29 SCREENING FOR ENDOCRINE, NUTRITIONAL, METABOLIC AND IMMUNITY DISORDER: ICD-10-CM

## 2025-06-03 DIAGNOSIS — Z13.6 SCREENING FOR CARDIOVASCULAR, RESPIRATORY, AND GENITOURINARY DISEASES: ICD-10-CM

## 2025-06-03 DIAGNOSIS — E56.9 VITAMIN DEFICIENCY: ICD-10-CM

## 2025-06-03 DIAGNOSIS — Z13.83 SCREENING FOR CARDIOVASCULAR, RESPIRATORY, AND GENITOURINARY DISEASES: ICD-10-CM

## 2025-06-04 ENCOUNTER — TELEPHONE (OUTPATIENT)
Dept: INTERNAL MEDICINE | Facility: CLINIC | Age: 25
End: 2025-06-04
Payer: COMMERCIAL